# Patient Record
Sex: MALE | Race: WHITE
[De-identification: names, ages, dates, MRNs, and addresses within clinical notes are randomized per-mention and may not be internally consistent; named-entity substitution may affect disease eponyms.]

---

## 2017-01-19 ENCOUNTER — HOSPITAL ENCOUNTER (OUTPATIENT)
Dept: HOSPITAL 47 - OR | Age: 52
Discharge: HOME | End: 2017-01-19
Attending: SURGERY
Payer: COMMERCIAL

## 2017-01-19 VITALS — BODY MASS INDEX: 25.8 KG/M2

## 2017-01-19 VITALS — DIASTOLIC BLOOD PRESSURE: 83 MMHG | HEART RATE: 60 BPM | SYSTOLIC BLOOD PRESSURE: 134 MMHG

## 2017-01-19 VITALS — RESPIRATION RATE: 16 BRPM

## 2017-01-19 VITALS — TEMPERATURE: 97.9 F

## 2017-01-19 DIAGNOSIS — Z88.1: ICD-10-CM

## 2017-01-19 DIAGNOSIS — Z88.0: ICD-10-CM

## 2017-01-19 DIAGNOSIS — K21.9: ICD-10-CM

## 2017-01-19 DIAGNOSIS — K42.9: ICD-10-CM

## 2017-01-19 DIAGNOSIS — K40.20: Primary | ICD-10-CM

## 2017-01-19 PROCEDURE — 49652: CPT

## 2017-01-19 PROCEDURE — 49650 LAP ING HERNIA REPAIR INIT: CPT

## 2017-01-19 RX ADMIN — POTASSIUM CHLORIDE SCH MLS: 14.9 INJECTION, SOLUTION INTRAVENOUS at 14:08

## 2017-01-19 RX ADMIN — POTASSIUM CHLORIDE SCH MLS: 14.9 INJECTION, SOLUTION INTRAVENOUS at 14:10

## 2017-01-19 NOTE — P.OP
Date of Procedure: 01/19/17


Preoperative Diagnosis: 


Symptomatic left inguinal hernia and umbilical hernia


Postoperative Diagnosis: 


Left indirect inguinal hernia


Right direct and indirect inguinal hernia


Umbilical hernia


Procedure(s) Performed: 


Robot-assisted laparoscopic inguinal hernia repair with mesh


Implants: 


10 x 15 Bard mesh that was not trimmed


Anesthesia: LAZARA


Surgeon: Scooter Greenfield


Pathology: none sent


Condition: stable


Disposition: PACU


Description of Procedure: 


Informed consent was obtained patient and then The patient was brought to the 

operating room and placed in supine position.  General anesthesia with 

endotracheal intubation was performed as per anesthesia team.   A bridges 

catheter was inserted under sterile aseptic precautions.  Chlorhexidine was 

used to prep the skin followed by application of sterile drapes .  He was 

placed in the lithotomy positionA timeout was performed to verify correct 

patient, correct procedure and correct side.  Patient was confirmed to receive 

perioperative IV antibiotics, subcutaneous heparin 5000 units and bilateral 

SCDs were placed.  The left upper quadrant point was identified and a stab 

incision was made.  Veress needle was introduced and placement was confirmed 

with the help of the drop test.  The abdomen was then insufflated to 15 mmHg.  

Once that was done 5 mm port was introduced into the left upper quadrant using 

the Optiview technique after which a 12 mm port was placed in the 

supraumbilical position and a 8 mm port in the right lower quadrant and then 

after that the left-sided 5 mm port was replaced with a robot 8 mm port under 

direct vision.  





The robot was then docked with the central camera port and the 2 side working 

ports.  The 30 degree of scope was introduced and the abdomen through the 12 mm 

port site.  Cardiere grasper for the left hand and scissor in the right hand 

was introduced in the abdominal cavity after which the median umbilical fold 

was retracted laterally towards the opposite side a small incision was made at 

the junction of the umbilical fold and the peritoneum and carried all the way 

laterally thus creating a small plane in the preperitoneal space.  The flap was 

developed  further with the help of blunt dissection using gentle stroking 

maneuvers all the way down to Tao ligament medially and laterally we went 

inferior exposing the vessels inferiorly.  The vas was identified and there was 

an indirect inguinal hernia.  Indirect hernia sac was dissected off the cord 

and reduced completely.  Once that was done the inferior flap was enlarged and 

some old made in it.  Attention was turned to the opposite side.  Peritoneal 

flap was created in a similar fashion and while dissecting was a direct hernia 

as well as an indirect hernia.  Both were re reduced with ease.  Once 

appropriate amount of area had been exposed for placement of the mesh 10 x 15 

Pro  mesh was introduced and placed in both right and left sides.  Once 

that was done the peritoneal flaps were closed with running V lock sutures.   

All the needles were then removed and accounted for.  At this time the 

procedure was completed.  The robotic instruments were removed and the robot 

was undocked.  Using the laparoscope 12 mm port site was closed using a Olegario 

Womack under direct vision using 0 PDS thus closing the umbilical hernia 

fascial edges.  Once that was done the abdomen was desufflated and the skin was 

closed with the help of 4-0 Monocryl.  Dermabond was applied.  Patient 

tolerated the procedure well.  There were no complications.  The Bridges catheter 

was removed and the patient extubated taken to recovery room in stable 

condition.

## 2021-06-30 ENCOUNTER — HOSPITAL ENCOUNTER (EMERGENCY)
Dept: HOSPITAL 47 - EC | Age: 56
Discharge: HOME | End: 2021-06-30
Payer: COMMERCIAL

## 2021-06-30 VITALS — HEART RATE: 55 BPM | DIASTOLIC BLOOD PRESSURE: 82 MMHG | SYSTOLIC BLOOD PRESSURE: 126 MMHG | RESPIRATION RATE: 18 BRPM

## 2021-06-30 VITALS — TEMPERATURE: 98 F

## 2021-06-30 DIAGNOSIS — S29.012A: Primary | ICD-10-CM

## 2021-06-30 DIAGNOSIS — Z88.0: ICD-10-CM
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
## 2021-06-30 DIAGNOSIS — E86.0: ICD-10-CM

## 2021-06-30 LAB
ALBUMIN SERPL-MCNC: 4.2 G/DL (ref 3.5–5)
ALP SERPL-CCNC: 58 U/L (ref 38–126)
ALT SERPL-CCNC: 20 U/L (ref 4–49)
ANION GAP SERPL CALC-SCNC: 9 MMOL/L
AST SERPL-CCNC: 22 U/L (ref 17–59)
BASOPHILS # BLD AUTO: 0.1 K/UL (ref 0–0.2)
BASOPHILS NFR BLD AUTO: 1 %
BUN SERPL-SCNC: 17 MG/DL (ref 9–20)
CALCIUM SPEC-MCNC: 9.4 MG/DL (ref 8.4–10.2)
CHLORIDE SERPL-SCNC: 102 MMOL/L (ref 98–107)
CO2 SERPL-SCNC: 27 MMOL/L (ref 22–30)
EOSINOPHIL # BLD AUTO: 1.2 K/UL (ref 0–0.7)
EOSINOPHIL NFR BLD AUTO: 14 %
ERYTHROCYTE [DISTWIDTH] IN BLOOD BY AUTOMATED COUNT: 4.95 M/UL (ref 4.3–5.9)
ERYTHROCYTE [DISTWIDTH] IN BLOOD: 14.3 % (ref 11.5–15.5)
GLUCOSE SERPL-MCNC: 112 MG/DL (ref 74–99)
HCT VFR BLD AUTO: 43 % (ref 39–53)
HGB BLD-MCNC: 14.5 GM/DL (ref 13–17.5)
KETONES UR QL STRIP.AUTO: (no result)
LYMPHOCYTES # SPEC AUTO: 1.9 K/UL (ref 1–4.8)
LYMPHOCYTES NFR SPEC AUTO: 21 %
MCH RBC QN AUTO: 29.4 PG (ref 25–35)
MCHC RBC AUTO-ENTMCNC: 33.8 G/DL (ref 31–37)
MCV RBC AUTO: 87 FL (ref 80–100)
MONOCYTES # BLD AUTO: 0.6 K/UL (ref 0–1)
MONOCYTES NFR BLD AUTO: 6 %
NEUTROPHILS # BLD AUTO: 5.1 K/UL (ref 1.3–7.7)
NEUTROPHILS NFR BLD AUTO: 57 %
PH UR: 5.5 [PH] (ref 5–8)
PLATELET # BLD AUTO: 222 K/UL (ref 150–450)
POTASSIUM SERPL-SCNC: 4.2 MMOL/L (ref 3.5–5.1)
PROT SERPL-MCNC: 6.4 G/DL (ref 6.3–8.2)
SODIUM SERPL-SCNC: 138 MMOL/L (ref 137–145)
SP GR UR: 1.03 (ref 1–1.03)
UROBILINOGEN UR QL STRIP: <2 MG/DL (ref ?–2)
WBC # BLD AUTO: 9 K/UL (ref 3.8–10.6)

## 2021-06-30 PROCEDURE — 96372 THER/PROPH/DIAG INJ SC/IM: CPT

## 2021-06-30 PROCEDURE — 85025 COMPLETE CBC W/AUTO DIFF WBC: CPT

## 2021-06-30 PROCEDURE — 96374 THER/PROPH/DIAG INJ IV PUSH: CPT

## 2021-06-30 PROCEDURE — 80053 COMPREHEN METABOLIC PANEL: CPT

## 2021-06-30 PROCEDURE — 81003 URINALYSIS AUTO W/O SCOPE: CPT

## 2021-06-30 PROCEDURE — 36415 COLL VENOUS BLD VENIPUNCTURE: CPT

## 2021-06-30 PROCEDURE — 96360 HYDRATION IV INFUSION INIT: CPT

## 2021-06-30 PROCEDURE — 99283 EMERGENCY DEPT VISIT LOW MDM: CPT

## 2021-06-30 NOTE — ED
Back Pain HPI





- General


Chief Complaint: Back Pain/Injury


Stated Complaint: back pain


Source: patient, family, RN notes reviewed


Limitations: no limitations





- History of Present Illness


Initial Comments: 





56-year-old white male presents to the emergency room with right-sided back pain

that he woke up with today.  He noticed a little bit of tightness last night he 

also noticed some tightness in his neck.  Patient denies any injury no heavy 

lifting.  She denies any fevers or dysuria.  No chest pain nausea vomiting or 

diarrhea.  He has a history of osteoarthritis, hypercholesterolemia and 

bilateral hernia repair.  States the pain 8 out of 10.  Wife at bedside states 

that she's had this type of pain before and was a kidney stone patient has no 

history of kidney stones.  


MD Complaint: back pain


-: days(s) (1)


Similar Symptoms Previously: No


Severity scale (1-10): 8


Quality: aching


Consistency: constant


Improves With: none


Worsens With: none


Associated Symptoms: denies other symptoms





- Related Data


                                  Previous Rx's











 Medication  Instructions  Recorded


 


HYDROcodone/APAP 5-325MG [Norco 1 tab PO Q4HR PRN #25 tab 01/19/17





5-325]  


 


Cyclobenzaprine [Flexeril] 5 mg PO TID PRN #15 tablet 06/30/21











                                    Allergies











Allergy/AdvReac Type Severity Reaction Status Date / Time


 


ampicillin Allergy  Dyspnea, Verified 06/30/21 18:56





   RASH  


 


Penicillins Allergy  Dyspnea, Verified 06/30/21 18:56





   RASH  














Review of Systems


ROS Statement: 


Those systems with pertinent positive or pertinent negative responses have been 

documented in the HPI.





ROS Other: All systems not noted in ROS Statement are negative.





Past Medical History


Past Medical History: Hyperlipidemia, Osteoarthritis (OA)


Additional Past Medical History / Comment(s): MINOR OA.  BILAT INGUINAL HERNIA


History of Any Multi-Drug Resistant Organisms: None Reported


Past Surgical History: Hernia Repair


Additional Past Surgical History / Comment(s): COLONOSCOPY


Past Anesthesia/Blood Transfusion Reactions: Motion Sickness


Past Psychological History: No Psychological Hx Reported


Smoking Status: Never smoker


Past Alcohol Use History: Occasional


Past Drug Use History: None Reported





- Past Family History


  ** Mother


Family Medical History: Cancer





General Exam


Limitations: no limitations


General appearance: alert, in no apparent distress


Head exam: Present: atraumatic, normocephalic, normal inspection


Eye exam: Present: normal appearance, PERRL, EOMI.  Absent: scleral icterus, 

conjunctival injection, periorbital swelling


ENT exam: Present: normal exam, normal oropharynx, mucous membranes moist


Neck exam: Present: normal inspection, full ROM.  Absent: tenderness, 

meningismus, lymphadenopathy, thyromegaly


Respiratory exam: Present: normal lung sounds bilaterally.  Absent: respiratory 

distress, wheezes, rales, rhonchi, stridor, chest wall tenderness, accessory mus

saad use, decreased breath sounds, prolonged expiratory


Cardiovascular Exam: Present: regular rate, normal rhythm, normal heart sounds. 

 Absent: systolic murmur, diastolic murmur, rubs, gallop, clicks


GI/Abdominal exam: Present: soft, normal bowel sounds.  Absent: distended, 

tenderness, guarding, rebound, rigid


Extremities exam: Present: normal inspection, full ROM, normal capillary refill.

  Absent: tenderness, pedal edema, joint swelling, calf tenderness


Back exam: Present: normal inspection, full ROM, other (Negative straight leg 

test bilaterally).  Absent: tenderness, CVA tenderness (R), CVA tenderness (L), 

muscle spasm, paraspinal tenderness, vertebral tenderness, rash noted


Neurological exam: Present: alert, oriented X3, CN II-XII intact


Psychiatric exam: Present: normal affect, normal mood


Skin exam: Present: warm, dry, intact, normal color.  Absent: rash, cyanosis, 

diaphoretic, erythema, petechiae, pallor, mottled





Course


                                   Vital Signs











  06/30/21





  18:55


 


Temperature 98 F


 


Pulse Rate 69


 


Respiratory 16





Rate 


 


Blood Pressure 147/93


 


O2 Sat by Pulse 98





Oximetry 














Medical Decision Making





- Medical Decision Making





WBC count was 9.0, platelet creatinine 17 and 0.76 respectively, urine shows 3+ 

ketones no blood no nitrites no leukocyte esterase.  This is consistent with 

dehydration and muscle cramping.  Patient was given Norflex with Toradol and a 

liter of fluid with relief.  He'll be directed to follow up with his primary 

care doctor as needed.  He'll be prescribed Flexeril as needed and Motrin 

over-the-counter.  Discussed with Dr. Diaz was agreeable with this plan of 

care.





- Lab Data


Result diagrams: 


                                 06/30/21 19:31





                                 06/30/21 19:31


                                   Lab Results











  06/30/21 06/30/21 06/30/21 Range/Units





  19:31 19:31 19:31 


 


WBC  9.0    (3.8-10.6)  k/uL


 


RBC  4.95    (4.30-5.90)  m/uL


 


Hgb  14.5    (13.0-17.5)  gm/dL


 


Hct  43.0    (39.0-53.0)  %


 


MCV  87.0    (80.0-100.0)  fL


 


MCH  29.4    (25.0-35.0)  pg


 


MCHC  33.8    (31.0-37.0)  g/dL


 


RDW  14.3    (11.5-15.5)  %


 


Plt Count  222    (150-450)  k/uL


 


MPV  6.6    


 


Neutrophils %  57    %


 


Lymphocytes %  21    %


 


Monocytes %  6    %


 


Eosinophils %  14    %


 


Basophils %  1    %


 


Neutrophils #  5.1    (1.3-7.7)  k/uL


 


Lymphocytes #  1.9    (1.0-4.8)  k/uL


 


Monocytes #  0.6    (0-1.0)  k/uL


 


Eosinophils #  1.2 H    (0-0.7)  k/uL


 


Basophils #  0.1    (0-0.2)  k/uL


 


Sodium    138  (137-145)  mmol/L


 


Potassium    4.2  (3.5-5.1)  mmol/L


 


Chloride    102  ()  mmol/L


 


Carbon Dioxide    27  (22-30)  mmol/L


 


Anion Gap    9  mmol/L


 


BUN    17  (9-20)  mg/dL


 


Creatinine    0.76  (0.66-1.25)  mg/dL


 


Est GFR (CKD-EPI)AfAm    >90  (>60 ml/min/1.73 sqM)  


 


Est GFR (CKD-EPI)NonAf    >90  (>60 ml/min/1.73 sqM)  


 


Glucose    112 H  (74-99)  mg/dL


 


Calcium    9.4  (8.4-10.2)  mg/dL


 


Total Bilirubin    0.4  (0.2-1.3)  mg/dL


 


AST    22  (17-59)  U/L


 


ALT    20  (4-49)  U/L


 


Alkaline Phosphatase    58  ()  U/L


 


Total Protein    6.4  (6.3-8.2)  g/dL


 


Albumin    4.2  (3.5-5.0)  g/dL


 


Urine Color   Yellow   


 


Urine Appearance   Clear   (Clear)  


 


Urine pH   5.5   (5.0-8.0)  


 


Ur Specific Gravity   1.027   (1.001-1.035)  


 


Urine Protein   Negative   (Negative)  


 


Urine Glucose (UA)   Negative   (Negative)  


 


Urine Ketones   3+ H   (Negative)  


 


Urine Blood   Negative   (Negative)  


 


Urine Nitrite   Negative   (Negative)  


 


Urine Bilirubin   Negative   (Negative)  


 


Urine Urobilinogen   <2.0   (<2.0)  mg/dL


 


Ur Leukocyte Esterase   Negative   (Negative)  














Disposition


Clinical Impression: 


 Dehydration, Muscle strain





Disposition: HOME SELF-CARE


Condition: Good


Instructions (If sedation given, give patient instructions):  Acute Low Back 

Pain (ED)


Additional Instructions: 


Take Motrin over-the-counter as prescribed and take Flexeril as needed for 

muscle spasms.  Increase fluid intake.  Follow-up with the primary care doctor 

in 1 week.


Prescriptions: 


Cyclobenzaprine [Flexeril] 5 mg PO TID PRN #15 tablet


 PRN Reason: Muscle Spasm


Is patient prescribed a controlled substance at d/c from ED?: No


Referrals: 


Gayathri Ward DO [Primary Care Provider] - 1-2 days


Time of Disposition: 21:25

## 2021-07-01 ENCOUNTER — HOSPITAL ENCOUNTER (EMERGENCY)
Dept: HOSPITAL 47 - EC | Age: 56
Discharge: HOME | End: 2021-07-01
Payer: COMMERCIAL

## 2021-07-01 VITALS — HEART RATE: 63 BPM | SYSTOLIC BLOOD PRESSURE: 147 MMHG | DIASTOLIC BLOOD PRESSURE: 92 MMHG | TEMPERATURE: 98 F

## 2021-07-01 VITALS — RESPIRATION RATE: 16 BRPM

## 2021-07-01 DIAGNOSIS — Z88.0: ICD-10-CM

## 2021-07-01 DIAGNOSIS — S39.012A: Primary | ICD-10-CM
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
## 2021-07-01 PROCEDURE — 72100 X-RAY EXAM L-S SPINE 2/3 VWS: CPT

## 2021-07-01 PROCEDURE — 96372 THER/PROPH/DIAG INJ SC/IM: CPT

## 2021-07-01 PROCEDURE — 99283 EMERGENCY DEPT VISIT LOW MDM: CPT

## 2021-07-01 NOTE — XR
EXAMINATION TYPE: XR lumbar spine 2 or 3V

 

DATE OF EXAM: 7/1/2021

 

COMPARISON: NONE

 

HISTORY: 56-year-old male with back pain

 

TECHNIQUE: Frontal and lateral radiographs of the lumbar spine

 

FINDINGS: There are 5 nonrib-bearing lumbar-type vertebral bodies.

 

There is slight straightening of lumbar curvature.

 

Vertebral body heights are normal.

 

Mild to moderate narrowing of the intervertebral spaces at L3-4, L4-5 and L5-S1.

 

Facet joint arthropathy changes and from T12 through S1.

 

Large amount of stool noted in the colon.

 

IMPRESSION: 

1. No acute osseous abnormality.

2. Mild-to-moderate degenerative changes in the lumbar spine.

3. Large amount of stool noted in the colon.

## 2021-07-01 NOTE — ED
Back Pain HPI





- General


Chief Complaint: Back Pain/Injury


Stated Complaint: Back Pain


Time Seen by Provider: 07/01/21 20:00


Source: patient





- History of Present Illness


Initial Comments: 





56-year-old male presents to the emergency department with a chief complaint of 

back pain.  Patient states he was evaluated yesterday in emergency department 

but is symptoms return.  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 

facility.  States his work is very labor intensive and he does have occasional 

back pain but nothing this significant.  States mostly the pain is located in 

the right lumbosacral region with no radiation of pain.  States the pain is 

exacerbated with a twisting motion whenever he is laying flat.  Denies any 

saddle anesthesia, urinary retention with bowel or overflow incontinence.  

Denies any fevers and chills.  Denies any obstructive or infectious urinary 

symptoms.  Denies any abdominal pain.





- Related Data


                                  Previous Rx's











 Medication  Instructions  Recorded


 


HYDROcodone/APAP 5-325MG [Norco 1 tab PO Q4HR PRN #25 tab 01/19/17





5-325]  


 


Cyclobenzaprine [Flexeril] 5 mg PO TID PRN #15 tablet 06/30/21











                                    Allergies











Allergy/AdvReac Type Severity Reaction Status Date / Time


 


ampicillin Allergy  Dyspnea, Verified 07/01/21 19:59





   RASH  


 


Penicillins Allergy  Dyspnea, Verified 07/01/21 19:59





   RASH  














Review of Systems


ROS Statement: 


Those systems with pertinent positive or pertinent negative responses have been 

documented in the HPI.





ROS Other: All systems not noted in ROS Statement are negative.





Past Medical History


Past Medical History: Hyperlipidemia, Osteoarthritis (OA)


Additional Past Medical History / Comment(s): MINOR OA.  BILAT INGUINAL HERNIA


History of Any Multi-Drug Resistant Organisms: None Reported


Past Surgical History: Hernia Repair


Additional Past Surgical History / Comment(s): COLONOSCOPY


Past Anesthesia/Blood Transfusion Reactions: Motion Sickness


Past Psychological History: No Psychological Hx Reported


Smoking Status: Never smoker


Past Alcohol Use History: Occasional


Past Drug Use History: None Reported





- Past Family History


  ** Mother


Family Medical History: Cancer





General Exam


Limitations: no limitations


General appearance: alert, in no apparent distress


Head exam: Present: atraumatic, normocephalic, normal inspection


Eye exam: Present: normal appearance, PERRL, EOMI


Pupils: Present: normal accommodation


ENT exam: Present: normal exam, normal oropharynx, mucous membranes moist


Neck exam: Present: normal inspection, full ROM.  Absent: tenderness, 

lymphadenopathy


Respiratory exam: Present: normal lung sounds bilaterally.  Absent: respiratory 

distress, wheezes, rales, rhonchi, stridor


Cardiovascular Exam: Present: regular rate, normal rhythm, normal heart sounds. 

 Absent: systolic murmur, diastolic murmur


GI/Abdominal exam: Present: soft.  Absent: distended, tenderness, guarding, 

rebound


Extremities exam: Present: normal inspection, full ROM, normal capillary refill.

  Absent: tenderness


Back exam: Present: normal inspection, full ROM, tenderness, CVA tenderness (R),

 paraspinal tenderness (Right paraspinal tenderness in the lumbosacral spine).  

Absent: CVA tenderness (L)


Neurological exam: Present: alert, oriented X3


Psychiatric exam: Present: normal affect, normal mood


Skin exam: Present: warm, dry, intact, normal color





Course


                                   Vital Signs











  07/01/21 07/01/21





  19:55 21:55


 


Temperature 98 F 


 


Pulse Rate 63 


 


Respiratory 18 16





Rate  


 


Blood Pressure 147/92 


 


O2 Sat by Pulse 99 





Oximetry  














Medical Decision Making





- Medical Decision Making





56-year-old male presents to the emergency department with a chief complaint of 

back pain.  On physical examination, right-sided paraspinal tenderness in the 

lumbosacral region.  No concern for cauda equina.  Patient was given symptomatic

 relief.  X-ray of the lumbar spine reveals mild to moderate degenerative disc 

disease in the lumbar spine.  There is also a large amount of stool in the 

colon.  Patient will be discharged with Tylenol 3 starter pack.  Also advised to

 take laxative in order to promote bowel movement.  Strict return parameters 

were thoroughly discussed the patient is an attending agreeable.  Case discussed

 with Dr. Barr.





Disposition


Clinical Impression: 


 Strain of lumbar region





Disposition: HOME SELF-CARE


Condition: Stable


Instructions (If sedation given, give patient instructions):  Acute Low Back 

Pain (ED)


Additional Instructions: 


Follow with orthopedic specialist.  Return to emergency department if symptoms 

worsen.


Is patient prescribed a controlled substance at d/c from ED?: No


Referrals: 


Gayathri Ward DO [Primary Care Provider] - 1-2 days


Ramon Trivedi MD [STAFF PHYSICIAN] - 1-2 days


Time of Disposition: 21:52

## 2022-09-19 ENCOUNTER — HOSPITAL ENCOUNTER (OUTPATIENT)
Dept: HOSPITAL 47 - EC | Age: 57
Setting detail: OBSERVATION
LOS: 1 days | Discharge: HOME | End: 2022-09-20
Attending: INTERNAL MEDICINE | Admitting: INTERNAL MEDICINE
Payer: COMMERCIAL

## 2022-09-19 DIAGNOSIS — E66.9: ICD-10-CM

## 2022-09-19 DIAGNOSIS — Z98.890: ICD-10-CM

## 2022-09-19 DIAGNOSIS — Z88.0: ICD-10-CM

## 2022-09-19 DIAGNOSIS — Z82.49: ICD-10-CM

## 2022-09-19 DIAGNOSIS — R07.81: ICD-10-CM

## 2022-09-19 DIAGNOSIS — R68.83: ICD-10-CM

## 2022-09-19 DIAGNOSIS — R91.8: ICD-10-CM

## 2022-09-19 DIAGNOSIS — S30.0XXA: ICD-10-CM

## 2022-09-19 DIAGNOSIS — Z80.9: ICD-10-CM

## 2022-09-19 DIAGNOSIS — R07.89: Primary | ICD-10-CM

## 2022-09-19 DIAGNOSIS — M19.071: ICD-10-CM

## 2022-09-19 DIAGNOSIS — Z86.16: ICD-10-CM
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
## 2022-09-19 DIAGNOSIS — E78.5: ICD-10-CM

## 2022-09-19 DIAGNOSIS — K21.9: ICD-10-CM

## 2022-09-19 DIAGNOSIS — I08.1: ICD-10-CM

## 2022-09-19 DIAGNOSIS — Z20.822: ICD-10-CM

## 2022-09-19 DIAGNOSIS — M19.072: ICD-10-CM

## 2022-09-19 DIAGNOSIS — R00.1: ICD-10-CM

## 2022-09-19 LAB
ALBUMIN SERPL-MCNC: 4.7 G/DL (ref 3.5–5)
ALP SERPL-CCNC: 67 U/L (ref 38–126)
ALT SERPL-CCNC: 20 U/L (ref 4–49)
ANION GAP SERPL CALC-SCNC: 11 MMOL/L
APTT BLD: 24.6 SEC (ref 22–30)
AST SERPL-CCNC: 20 U/L (ref 17–59)
BASOPHILS # BLD AUTO: 0.1 K/UL (ref 0–0.2)
BASOPHILS NFR BLD AUTO: 1 %
BUN SERPL-SCNC: 17 MG/DL (ref 9–20)
CALCIUM SPEC-MCNC: 9.6 MG/DL (ref 8.4–10.2)
CHLORIDE SERPL-SCNC: 101 MMOL/L (ref 98–107)
CO2 SERPL-SCNC: 24 MMOL/L (ref 22–30)
EOSINOPHIL # BLD AUTO: 0.1 K/UL (ref 0–0.7)
EOSINOPHIL NFR BLD AUTO: 1 %
ERYTHROCYTE [DISTWIDTH] IN BLOOD BY AUTOMATED COUNT: 4.97 M/UL (ref 4.3–5.9)
ERYTHROCYTE [DISTWIDTH] IN BLOOD: 14.3 % (ref 11.5–15.5)
GLUCOSE SERPL-MCNC: 123 MG/DL (ref 74–99)
HCT VFR BLD AUTO: 44.3 % (ref 39–53)
HGB BLD-MCNC: 14.9 GM/DL (ref 13–17.5)
INR PPP: 0.9 (ref ?–1.2)
LYMPHOCYTES # SPEC AUTO: 1.2 K/UL (ref 1–4.8)
LYMPHOCYTES NFR SPEC AUTO: 14 %
MAGNESIUM SPEC-SCNC: 2 MG/DL (ref 1.6–2.3)
MCH RBC QN AUTO: 29.9 PG (ref 25–35)
MCHC RBC AUTO-ENTMCNC: 33.6 G/DL (ref 31–37)
MCV RBC AUTO: 89.1 FL (ref 80–100)
MONOCYTES # BLD AUTO: 0.7 K/UL (ref 0–1)
MONOCYTES NFR BLD AUTO: 8 %
NEUTROPHILS # BLD AUTO: 6.6 K/UL (ref 1.3–7.7)
NEUTROPHILS NFR BLD AUTO: 75 %
PLATELET # BLD AUTO: 234 K/UL (ref 150–450)
POTASSIUM SERPL-SCNC: 4.2 MMOL/L (ref 3.5–5.1)
PROT SERPL-MCNC: 6.9 G/DL (ref 6.3–8.2)
PT BLD: 10.2 SEC (ref 9–12)
SODIUM SERPL-SCNC: 136 MMOL/L (ref 137–145)
WBC # BLD AUTO: 8.7 K/UL (ref 3.8–10.6)

## 2022-09-19 PROCEDURE — 96375 TX/PRO/DX INJ NEW DRUG ADDON: CPT

## 2022-09-19 PROCEDURE — 96372 THER/PROPH/DIAG INJ SC/IM: CPT

## 2022-09-19 PROCEDURE — 85610 PROTHROMBIN TIME: CPT

## 2022-09-19 PROCEDURE — 85730 THROMBOPLASTIN TIME PARTIAL: CPT

## 2022-09-19 PROCEDURE — 96365 THER/PROPH/DIAG IV INF INIT: CPT

## 2022-09-19 PROCEDURE — 96367 TX/PROPH/DG ADDL SEQ IV INF: CPT

## 2022-09-19 PROCEDURE — 80053 COMPREHEN METABOLIC PANEL: CPT

## 2022-09-19 PROCEDURE — 99285 EMERGENCY DEPT VISIT HI MDM: CPT

## 2022-09-19 PROCEDURE — 84484 ASSAY OF TROPONIN QUANT: CPT

## 2022-09-19 PROCEDURE — 85025 COMPLETE CBC W/AUTO DIFF WBC: CPT

## 2022-09-19 PROCEDURE — 93005 ELECTROCARDIOGRAM TRACING: CPT

## 2022-09-19 PROCEDURE — 83735 ASSAY OF MAGNESIUM: CPT

## 2022-09-19 PROCEDURE — 87635 SARS-COV-2 COVID-19 AMP PRB: CPT

## 2022-09-19 PROCEDURE — 93306 TTE W/DOPPLER COMPLETE: CPT

## 2022-09-19 PROCEDURE — 87040 BLOOD CULTURE FOR BACTERIA: CPT

## 2022-09-19 PROCEDURE — 71046 X-RAY EXAM CHEST 2 VIEWS: CPT

## 2022-09-19 PROCEDURE — 80061 LIPID PANEL: CPT

## 2022-09-19 PROCEDURE — 85379 FIBRIN DEGRADATION QUANT: CPT

## 2022-09-19 PROCEDURE — 94760 N-INVAS EAR/PLS OXIMETRY 1: CPT

## 2022-09-19 PROCEDURE — 36415 COLL VENOUS BLD VENIPUNCTURE: CPT

## 2022-09-19 PROCEDURE — 96376 TX/PRO/DX INJ SAME DRUG ADON: CPT

## 2022-09-19 RX ADMIN — KETOROLAC TROMETHAMINE SCH: 15 INJECTION, SOLUTION INTRAMUSCULAR; INTRAVENOUS at 17:17

## 2022-09-19 RX ADMIN — NITROGLYCERIN SCH: 20 OINTMENT TOPICAL at 23:38

## 2022-09-19 RX ADMIN — NITROGLYCERIN SCH INCH: 20 OINTMENT TOPICAL at 18:09

## 2022-09-19 RX ADMIN — KETOROLAC TROMETHAMINE SCH MG: 15 INJECTION, SOLUTION INTRAMUSCULAR; INTRAVENOUS at 22:28

## 2022-09-19 NOTE — ED
Chest Pain HPI





- General


Chief Complaint: Chest Pain


Stated Complaint: chest pain


Time Seen by Provider: 09/19/22 13:34


Source: patient


Mode of arrival: ambulatory


Limitations: no limitations





- History of Present Illness


Initial Comments: 





This 57-year-old male presents complaining of some left-sided chest pain.  He 

states that it started yesterday when he was bending over a car.  It is 

described as a dull achy type sensation that radiates into his left shoulder.  

It is slightly worse with deep inspiration.  He states that it seems somewhat 

worse when he is sitting down at rest and feels somewhat better when he is 

walking.  He denies any previous similar incidents.  He has never had any 

cardiac issues in the past.  He's never had a stress test or a heart 

catheterization.  There is no leg pain or swelling.  He denies any history of 

DVT or PE.  He denies any chest injuries.  The pain is not reproducible with 

movements.  He denies any other complaints or modifying factors.  He denies any 

coughing or problems with fevers.  He states that the pain is somewhat worsened 

when he takes a deep breath.





- Related Data


                                Home Medications











 Medication  Instructions  Recorded  Confirmed


 


No Known Home Medications  09/19/22 09/19/22











                                    Allergies











Allergy/AdvReac Type Severity Reaction Status Date / Time


 


ampicillin Allergy  Dyspnea, Verified 09/19/22 15:28





   RASH  


 


Penicillins Allergy  Dyspnea, Verified 09/19/22 15:28





   RASH  














Review of Systems


ROS Statement: 


Those systems with pertinent positive or pertinent negative responses have been 

documented in the HPI.





ROS Other: All systems not noted in ROS Statement are negative.





Past Medical History


Past Medical History: Hyperlipidemia, Osteoarthritis (OA)


Additional Past Medical History / Comment(s): MINOR OA.  BILAT INGUINAL HERNIA


History of Any Multi-Drug Resistant Organisms: None Reported


Past Surgical History: Hernia Repair


Additional Past Surgical History / Comment(s): COLONOSCOPY


Past Anesthesia/Blood Transfusion Reactions: Motion Sickness


Past Psychological History: No Psychological Hx Reported


Smoking Status: Never smoker


Past Alcohol Use History: Occasional


Past Drug Use History: None Reported





- Past Family History


  ** Mother


Family Medical History: Cancer





General Exam





- General Exam Comments


Initial Comments: 





GENERAL: The patient is well nourished and well hydrated. 


VITAL SIGNS: Heart rate, blood pressure, respiratory rate reviewed as recorded 

in nurse's notes. 


EYES: Pupils are round and reactive. Extraocular movements are intact. No 

conjunctival / lid redness or swelling. 


ENT: No external evidence of injury, swelling, or ecchymosis. Airway is patent. 

Throat is clear. 


NECK: Nontender. No swelling or evidence of injury. No subcutaneous emphysema. 

Trachea is midline. No thyroid mass. 


HEART: Regular rate and rhythm. Good peripheral pulses. 


LUNGS/CHEST: Breath sounds clear and equal bilaterally. No rales, rhonchi, or 

wheezes. No ecchymosis, subcutaneous emphysema, or tenderness. 


ABDOMEN: Abdomen soft without tenderness. No palpable masses or organomegaly. No

peritoneal signs. No abdominal wall swelling or ecchymosis. 


EXTREMITIES: No extremity tenderness. Normal muscle tone and function. No 

thoracolumbar tenderness. 


NEUROLOGIC: Sensation is grossly intact. Cranial nerve exam reveals face is 

symmetrical, tongue is midline, speech is clear. 


SKIN: No abrasions or ecchymosis is noted. No induration or masses noted. 


PSYCHIATRIC: Alert and oriented. Appropriate behavior and judgment.





Limitations: no limitations





Course


                                   Vital Signs











  09/19/22 09/19/22 09/19/22





  13:20 14:39 15:49


 


Temperature 99.3 F  


 


Pulse Rate 86 82 68


 


Respiratory 16 18 18





Rate   


 


Blood Pressure 133/91 119/83 112/74


 


O2 Sat by Pulse 96 94 L 95





Oximetry   














Chest Pain MDM





- MDM





The patient was seen and examined.  All diagnostics are reviewed.  His EKG shows

a normal sinus rhythm.  There is some mild artifact noted.  There is no acute 

ST-T wave changes identified.  The NM intervals 129, QS duration is 85, and the 

QTc interval 393.  The patient received aspirin as well as Nitropaste.  On 

recheck, he is still having the pain.  He receives Toradol IV as well.  He has a

chest x-ray which shows either pneumonia, atelectasis, or effusion noted in the 

left lower lobe.  The patient may have a degree of pneumonia and is therefore 

started on Rocephin and Zithromax intravenously.  The laboratory is all 

essentially within normal limits with a negative d-dimer.  His symptomatology 

sounds more cardiac in nature.  It is felt as though he benefit from admission 

for further workup in this regard.  Patient is agreeable.  Case will be 

discussed with internal medicine in the near future.





Disposition


Clinical Impression: 


 Chest pain, Unstable angina, Pneumonia





Disposition: ADMITTED AS IP TO THIS [facility]


Condition: Fair


Time of Disposition: 16:00


Decision Date: 09/19/22


Decision Time: 16:00

## 2022-09-19 NOTE — XR
EXAMINATION TYPE: XR chest 2V

 

DATE OF EXAM: 9/19/2022

 

COMPARISON: NONE

 

HISTORY: Chest pain

 

TECHNIQUE:  Frontal and lateral views of the chest are obtained.

 

FINDINGS:  Patchy densities present at the left lung base. No evident pneumothorax. Cardiac mediastin
al silhouette is within normal limits. Patient is rotated. Bone mineralization is normal. There is th
oracic spondylosis. Lung volumes are low.

 

IMPRESSION:  Correlate for left lower lobe pneumonia versus atelectasis, difficult to exclude effusio
n

## 2022-09-20 VITALS
DIASTOLIC BLOOD PRESSURE: 50 MMHG | HEART RATE: 68 BPM | TEMPERATURE: 98.3 F | RESPIRATION RATE: 14 BRPM | SYSTOLIC BLOOD PRESSURE: 127 MMHG

## 2022-09-20 LAB
CHOLEST SERPL-MCNC: 210 MG/DL (ref 0–200)
HDLC SERPL-MCNC: 54.7 MG/DL (ref 40–60)
LDLC SERPL CALC-MCNC: 130.3 MG/DL (ref 0–131)
TRIGL SERPL-MCNC: 125 MG/DL (ref 0–149)
VLDLC SERPL CALC-MCNC: 25 MG/DL (ref 5–40)

## 2022-09-20 RX ADMIN — NITROGLYCERIN SCH: 20 OINTMENT TOPICAL at 12:35

## 2022-09-20 RX ADMIN — KETOROLAC TROMETHAMINE SCH MG: 15 INJECTION, SOLUTION INTRAMUSCULAR; INTRAVENOUS at 12:31

## 2022-09-20 RX ADMIN — NITROGLYCERIN SCH: 20 OINTMENT TOPICAL at 05:30

## 2022-09-20 RX ADMIN — KETOROLAC TROMETHAMINE SCH MG: 15 INJECTION, SOLUTION INTRAMUSCULAR; INTRAVENOUS at 05:28

## 2022-09-20 NOTE — P.HPIM
History of Present Illness


H&P Date: 09/20/22











This is a pleasant 57-year-old male who presented to the emergency department 

with some left-sided chest pain that was dull and achy and radiating up into his

left shoulder and experiencing worsening acid reflux.  Patient reports it 

started 1 day prior and becoming more intense and difficulty to take deep 

inspiration.  Patient denies any recent sick contacts, shortness of breath, or 

cough with congestion.  Patient follows with Dr. Brumfield in the outpatient 

setting and denies significant past medical history other than borderline high 

cholesterol and some mild  osteoarthritis.  Patient reports to never smoking 

does not gape or use any other drugs and occasionally drinks alcohol socially.  

Patient also reports he was doing some work outside and subsequently fell onto 

the concrete approximately 2 weeks ago with a large hematoma of his left buttock

that had radiated up to his back.  Patient denies any aspirin use or blood 

thinners.  Patient is vaccinated for Covid and reports having it earlier this 

year with no respiratory symptoms.  Chest x-ray on admission shows a correlate 

for left lower lobe pneumonia versus atelectasis and difficult to exclude 

effusion but no evident pneumothorax.  Cardiology was consulted and echo was 

ordered and patient was evaluated by cardiology recommending outpatient follow-

up as needed.  An echo showed an EF of 55-60 with some mild regurgitation noted 

otherwise normal LV systolic function.  Patient was started on empiric 

antibiotics in the form of ceftriaxone and Zithromax in the ER.  Patient with no

fevers and WBC was normal.  EKG showed sinus bradycardia.








Review Of Systems:





Constitutional: No fever, no chills, no night sweats.  No weight change.  No 

weakness, fatigue or lethargy.  No daytime sleepiness.


EENT: No headache.  No blurred vision or double vision, no loss of vision.  No 

loss of Hearing, no ringing in the ears, no dizziness.  No nasal drainage or 

congestion.  No epistaxis.  No sore throat.


Lungs: Reports some mild shortness of breath with deep inspiration, no cough, no

sputum production.  No wheezing.


Cardiovascular: Reports epigastric chest pain, no lower extremity edema.  No 

palpitations.  No paroxysmal nocturnal dyspnea.  No orthopnea.  No 

lightheadedness or dizziness.  No syncopal episodes.


Abdominal: No abdominal pain.  No nausea, vomiting.  No diarrhea.  No 

constipation.  No bloody or tarry stools..  No loss of appetite.


Genitourinary: No dysuria, increased frequency, urgency.  No urinary retention.


Musculoskeletal: No myalgias.  No muscle weakness, no gait dysfunction, no 

frequent falls.  No back pain.  No neck pain.


Integumentary: No wounds, no lesions.  No rash or pruritus.  Reports left 

buttock bruising.  Status post fall 2 weeks ago  No change in hair or nails.


Neurologic: No aphasia. No facial droop. No change in mentation. No head injury.

No headache. No paralysis. No paresthesia.


Psychiatric: No depression.  No anxiety.  No mood swings.


Endocrine: No abnormal blood sugars.  No weight change.  No excessive sweating 

or thirst.  No cold intolerance.  














PHYSICAL EXAMINATION: 





GENERAL: The patient is alert and oriented x4,  Well developed, well nourished. 


HEENT: Pupils are round and equally reacting to light. EOMI. no scleral icterus.

No conjunctival pallor. Normocephalic, atraumatic. No pharyngeal erythema. No 

thyromegaly.  


CARDIOVASCULAR: S1 and S2  muffled 


PULMONARY: diminished breath sounds bilaterally otherwise clear to auscultation 

with no wheezing or rhonchi noted. 


ABDOMEN: soft.  Nontender on exam.  non-distended, normoactive bowel sounds. No 

palpable organomegaly. 


MUSCULOSKELETAL: No joint swelling or deformity.


EXTREMITIES: No cyanosis, clubbing, or pedal edema.  


NEUROLOGICAL: Gross neurological examination did not reveal any focal deficits. 


SKIN: No rashes. 





Assessment:





Chest discomfort, atypical, ACS ruled out


Pleuritic chest pain on deep inspiration


Possible left lower lobe pneumonia, although suspicion is low and treated 

empirically


Hyperlipidemia


Recent fall with no LOC onto the left buttock with a large bruised buttock


GI prophylaxis


DVT prophylaxis


Full code





Plan:





Recommend to continue with current medications and management and cardiology has

evaluated the patient and underwent 2-D echo showing LV systolic function is 

normal with an EF of 55-60% with some mild concentric LVH and mild mitral and 

tricuspid regurgitation present recommending outpatient follow-up as needed.  

Serial troponins have been negative 3.  Patient is borderline h

ypercholesterolemia and reports he has been working on his diet and does follow 

with Dr. Brumfield for this.  Patient was initiated on empiric ceftriaxone and 

Zithromax for questionable possible left lower lobe infiltrate versus 

atelectasis.  We'll provide incentive spirometer and continue with empiric 

antibiotics for 5 days to complete the course.  Strongly recommend follow-up 

with primary care provider and possible chest x-ray in the next few weeks.  

Patient is maintaining oxygen saturation on room air above 95% and encouraged to

continue using incentive spirometer at least 10 times every hour while awake.  

D-dimer normal and WBC normal and patient is afebrile.  Patient will likely 

discharge later this afternoon.








The impression and plan of care has been dictated by Oly Enriquez, nurse 

practitioner as directed.





Dr. Marycruz MENARD


I have performed a history and examination and MDM of this patient, discussed 

the same with the dictator, and  agree with the dictator's assessment and plan 

as written ,documented as a scribe. Based on total visit time,  I have performed

more than 50% of the visit. Any additional findings or plans will be noted. 





Past Medical History


Past Medical History: Hyperlipidemia, Osteoarthritis (OA)


Additional Past Medical History / Comment(s): MINOR OA. in bilat feet which pt 

stated has caused some balance issues.  BILAT INGUINAL HERNIA


History of Any Multi-Drug Resistant Organisms: None Reported


Past Surgical History: Hernia Repair


Additional Past Surgical History / Comment(s): COLONOSCOPY


Past Anesthesia/Blood Transfusion Reactions: Motion Sickness


Past Psychological History: No Psychological Hx Reported


Smoking Status: Never smoker


Past Alcohol Use History: Occasional


Additional Past Alcohol Use History / Comment(s): 6 PACK PER WK


Past Drug Use History: None Reported





- Past Family History


  ** Mother


Family Medical History: Cancer





Medications and Allergies


                                Home Medications











 Medication  Instructions  Recorded  Confirmed  Type


 


Azithromycin [Zithromax] 500 mg PO DAILY 5 Days #5 tab 09/20/22  Rx


 


Ibuprofen [Motrin] 600 mg PO Q6HR PRN #60 tab 09/20/22  Rx


 


cefUROXime axetiL [Ceftin] 500 mg PO BID 5 Days #10 tab 09/20/22  Rx








                                    Allergies











Allergy/AdvReac Type Severity Reaction Status Date / Time


 


ampicillin Allergy  Dyspnea, Verified 09/19/22 15:28





   RASH  


 


Penicillins Allergy  Dyspnea, Verified 09/19/22 15:28





   RASH  














Physical Exam


Vitals: 


                                   Vital Signs











  Temp Pulse Pulse Resp BP BP Pulse Ox


 


 09/20/22 07:40    59 L  16   


 


 09/20/22 07:00  98.4 F   72  14   105/66  94 L


 


 09/20/22 03:02  98.8 F   59 L  15   94/57  96


 


 09/19/22 21:16  98.3 F   65  15   99/61  95


 


 09/19/22 19:58   77   16  108/69   99


 


 09/19/22 19:43        95


 


 09/19/22 18:11   61   19  105/71   98


 


 09/19/22 17:07   66   18  105/71   96


 


 09/19/22 15:49   68   18  112/74   95


 


 09/19/22 14:39   82   18  119/83   94 L


 


 09/19/22 13:20  99.3 F  86   16  133/91   96














  FiO2


 


 09/20/22 07:40 


 


 09/20/22 07:00 


 


 09/20/22 03:02 


 


 09/19/22 21:16 


 


 09/19/22 19:58 


 


 09/19/22 19:43  21


 


 09/19/22 18:11 


 


 09/19/22 17:07 


 


 09/19/22 15:49 


 


 09/19/22 14:39 


 


 09/19/22 13:20 








                                Intake and Output











 09/19/22 09/20/22 09/20/22





 22:59 06:59 14:59


 


Other:   


 


  # Voids 2 1 


 


  Weight 79.379 kg  














Results


CBC & Chem 7: 


                                 09/19/22 13:46





                                 09/19/22 13:46


Labs: 


                  Abnormal Lab Results - Last 24 Hours (Table)











  09/19/22 Range/Units





  13:46 


 


Sodium  136 L  (137-145)  mmol/L


 


Glucose  123 H  (74-99)  mg/dL














Thrombosis Risk Factor Assmnt





- Choose All That Apply


Any of the Below Risk Factors Present?: Yes


Each Factor Represents 1 point: Age 41-60 years, Obesity (BMI >25)


Other Risk Factors: No


Other congenital or acquired thrombophilia - If yes, enter type in comment: No


Thrombosis Risk Factor Assessment Total Risk Factor Score: 2


Thrombosis Risk Factor Assessment Level: Low Risk

## 2022-09-20 NOTE — P.CRDCN
History of Present Illness


History of present illness: 


This is a 57 year old male with a past medical history of family history of 

coronary artery disease (father had 3v CABG in his 60s). He does not follow with

a cardiologist. We are consulted for chest pain. Patient presented to the ER 

with 2 day history of left sided chest discomfort. It was non-radiating, non-

exertional. He states yesterday he was working on his car and did noticed a "sor

e" pain left upper rib area. It was aggravated by taking a deep breath and 

resting. The pain improved with movement of his chest and walking. He also 

endorsed associated chills. Denies any fever, cough, diaphoresis, nausea, 

vomiting, shortness of breath. He denies any symptoms of orthopnea or PND. He 

denies any history of CAD, MI, Stroke, Diabetes, hypertension, dyslipidemia. He 

denies tobacco use. 





DIAGNOSTICS


* EKG reveals sinus rhythm, heart rate 80, T wave inversions in lead III and 

  aVF. No significant ST-T wave abnormalities to suggest ischemia 


* Telemetry tracings indicate sinus rhythm 


* Chest xray patchy densities present in the left lung base.


* Laboratory reviewed, troponin negative 3, d-dimer negative, CBC unremarkable,

  Covid-19 Negative, sodium 139, potassium 4.2, BUN 17, serum creatinine 1.01


* Current home medications include none





REVIEW OF SYSTEMS


At the time of my exam:


CONSTITUTIONAL: Denies fever. Reports chills.


CARDIOVASCULAR: + chest pain worse with deep breathing on exam, shortness of 

breath, orthopnea, PND or palpitations.


RESPIRATORY: Denies cough. 


GASTROINTESTINAL: Denies abdominal pain, diarrhea, constipation, nausea or 

vomiting.


MUSCULOSKELETAL: Denies myalgias.


NEUROLOGIC: Denies numbness, tingling, headache or weakness.


ENDOCRINE: Denies fatigue, weight change,  polydipsia or polyurina.


GENITOURINARY: Denies burning, hematuria or urgency with micturation.


HEMATOLOGIC: Denies history of anemia or bleeding. 





PHYSICAL EXAMINATION


Blood pressure 105/66, heart rate 72, afebrile, saturations 94% on room air


CONSTITUTIONAL: No apparent distress. 


HEENT: Head is normocephalic. Pupils are equal, round. Sclerae anicteric. Mucous

membranes of the mouth are moist.  No JVD. No carotid bruit.


CHEST EXAMINATION: Lungs are clear to auscultation. There is chest wall 

tenderness is noted with deep breathing. 


HEART EXAMINATION: Regular rate and rhythm. S1, S2 heard. No murmurs, gallops or

rub.


ABDOMEN: Soft, nontender. Positive bowel sounds.


EXTREMITIES: 2+ peripheral pulses, no lower extremity edema and no calf 

tenderness.


SKIN: warm dry. 


NEUROLOGIC EXAMINATION: Patient is awake, alert and oriented x3. 





ASSESSMENT


Chest discomfort, atypical, appears pleuritic on exam with aggravation by deep 

inspiration


Symptoms of Chills


Possible left lower lobe pneumonia


Family history of coronary artery disease





PLAN


An acute coronary event has been ruled out with no EKG evidence of ischemia and 

negative cardiac enzymes. 


Echocardiogram obtained that revealed EF 55-60%, mild concentric LVH, mild MR, 

mild TR


No further inpatient workup from a cardiology perspective


Patient may follow up outpatient. 





Thank you kindly for this consultation. 


Nurse practitioner note has been reviewed by physician. Signing provider agrees 

with the documented findings, assessment, and plan of care. 








Past Medical History


Past Medical History: Hyperlipidemia, Osteoarthritis (OA)


Additional Past Medical History / Comment(s): MINOR OA. in bilat feet which pt 

stated has caused some balance issues.  BILAT INGUINAL HERNIA


History of Any Multi-Drug Resistant Organisms: None Reported


Past Surgical History: Hernia Repair


Additional Past Surgical History / Comment(s): COLONOSCOPY


Past Anesthesia/Blood Transfusion Reactions: Motion Sickness


Past Psychological History: No Psychological Hx Reported


Smoking Status: Never smoker


Past Alcohol Use History: Occasional


Additional Past Alcohol Use History / Comment(s): 6 PACK PER WK


Past Drug Use History: None Reported





- Past Family History


  ** Mother


Family Medical History: Cancer





Medications and Allergies


                                Home Medications











 Medication  Instructions  Recorded  Confirmed  Type


 


No Known Home Medications  09/19/22 09/19/22 History








                                    Allergies











Allergy/AdvReac Type Severity Reaction Status Date / Time


 


ampicillin Allergy  Dyspnea, Verified 09/19/22 15:28





   RASH  


 


Penicillins Allergy  Dyspnea, Verified 09/19/22 15:28





   RASH  














Physical Exam


Vitals: 


                                   Vital Signs











  Temp Pulse Pulse Resp BP BP Pulse Ox


 


 09/20/22 03:02  98.8 F   59 L  15   94/57  96


 


 09/19/22 21:16  98.3 F   65  15   99/61  95


 


 09/19/22 19:58   77   16  108/69   99


 


 09/19/22 19:43        95


 


 09/19/22 18:11   61   19  105/71   98


 


 09/19/22 17:07   66   18  105/71   96


 


 09/19/22 15:49   68   18  112/74   95


 


 09/19/22 14:39   82   18  119/83   94 L


 


 09/19/22 13:20  99.3 F  86   16  133/91   96














  FiO2


 


 09/20/22 03:02 


 


 09/19/22 21:16 


 


 09/19/22 19:58 


 


 09/19/22 19:43  21


 


 09/19/22 18:11 


 


 09/19/22 17:07 


 


 09/19/22 15:49 


 


 09/19/22 14:39 


 


 09/19/22 13:20 








                                Intake and Output











 09/19/22 09/20/22 09/20/22





 22:59 06:59 14:59


 


Other:   


 


  # Voids 2 1 


 


  Weight 79.379 kg  














Results





                                 09/19/22 13:46





                                 09/19/22 13:46


                                 Cardiac Enzymes











  09/19/22 09/19/22 09/19/22 Range/Units





  13:46 13:46 16:57 


 


AST  20    (17-59)  U/L


 


Troponin I   <0.012  <0.012  (0.000-0.034)  ng/mL














  09/19/22 Range/Units





  19:50 


 


AST   (17-59)  U/L


 


Troponin I  <0.012  (0.000-0.034)  ng/mL








                                   Coagulation











  09/19/22 Range/Units





  13:46 


 


PT  10.2  (9.0-12.0)  sec


 


APTT  24.6  (22.0-30.0)  sec








                                       CBC











  09/19/22 Range/Units





  13:46 


 


WBC  8.7  (3.8-10.6)  k/uL


 


RBC  4.97  (4.30-5.90)  m/uL


 


Hgb  14.9  (13.0-17.5)  gm/dL


 


Hct  44.3  (39.0-53.0)  %


 


Plt Count  234  (150-450)  k/uL








                          Comprehensive Metabolic Panel











  09/19/22 Range/Units





  13:46 


 


Sodium  136 L  (137-145)  mmol/L


 


Potassium  4.2  (3.5-5.1)  mmol/L


 


Chloride  101  ()  mmol/L


 


Carbon Dioxide  24  (22-30)  mmol/L


 


BUN  17  (9-20)  mg/dL


 


Creatinine  1.01  (0.66-1.25)  mg/dL


 


Glucose  123 H  (74-99)  mg/dL


 


Calcium  9.6  (8.4-10.2)  mg/dL


 


AST  20  (17-59)  U/L


 


ALT  20  (4-49)  U/L


 


Alkaline Phosphatase  67  ()  U/L


 


Total Protein  6.9  (6.3-8.2)  g/dL


 


Albumin  4.7  (3.5-5.0)  g/dL








                               Current Medications











Generic Name Dose Route Start Last Admin





  Trade Name [place]  PRN Reason Stop Dose Admin


 


Aspirin  325 mg  09/20/22 09:00 





  Aspirin 325 Mg Tab  PO  





  DAILY [facility]  


 


Enoxaparin Sodium  40 mg  09/20/22 09:00 





  Enoxaparin 40 Mg/0.4 Ml Syringe  SQ  





  DAILY [facility]  


 


Azithromycin 500 mg/ Sodium  250 mls @ 250 mls/hr  09/20/22 17:00 





  Chloride  IVPB  09/23/22 17:01 





  DAILY@1700 [facility]  





  Protocol  


 


Ceftriaxone Sodium 1 gm/  50 mls @ 100 mls/hr  09/20/22 09:00 





  Sodium Chloride  IVPB  





  DAILY [facility]  





  Protocol  


 


Ketorolac Tromethamine  15 mg  09/19/22 18:00  09/20/22 05:28





  Ketorolac 15 Mg/Ml 1 Ml Vial  IVP  09/22/22 16:22  15 mg





  Q6HR [facility]   Administration


 


Nitroglycerin  0.4 mg  09/19/22 16:17 





  Nitroglycerin Sl Tabs 0.4 Mg Tab  SUBLINGUAL  





  Q5M PRN  





  Chest Pain  


 


Nitroglycerin  1 inch  09/19/22 18:00  09/20/22 05:30





  Nitroglycerin Oint 1 Inch/Gm Packet  TOPICAL   Not Given





  Q6HR [facility]  








                                Intake and Output











 09/19/22 09/20/22 09/20/22





 22:59 06:59 14:59


 


Other:   


 


  # Voids 2 1 


 


  Weight 79.379 kg  








                                        





                                 09/19/22 13:46 





                                 09/19/22 13:46

## 2022-09-20 NOTE — CA
Transthoracic Echo Report 

 Name: Baltazar Palomares 

 MRN:    [id number] 

 Age:    57     Gender:     M 

 

 :    1965 

 Exam Date:     2022 08:13 

 Exam Location: [facility] 

 Ht (in):     68     Wt (lb):     175 

 Ordering Physician:        Randolph Lambert DO 

 Attending/Referring Phys:         , Petra 

 Technician         Amy Nicole, [facility] 

 Procedure CPT: 

 Indications:       CP 

 

 Cardiac Hx: 

 Technical Quality:      Fair 

 Contrast 1:                                Total Dose (mL): 

 Contrast 2:                                Total Dose (mL): 

 

 MEASUREMENTS  (Male / Female) Normal Values 

 2D ECHO 

 LV Diastolic Diameter PLAX        4.2 cm                4.2 - 5.9 / 3.9 - 5.3 cm 

 LV Systolic Diameter PLAX         2.8 cm                 

 IVS Diastolic Thickness           1.1 cm                0.6 - 1.0 / 0.6 - 0.9 cm 

 LVPW Diastolic Thickness          1.3 cm                0.6 - 1.0 / 0.6 - 0.9 cm 

 LV Relative Wall Thickness        0.6                    

 RV Internal Dim ED PLAX           4.0 cm                 

 LA Volume                         50.2 cm???              18 - 58 / 22 - 52 cm??? 

 

 M-MODE 

 Aortic Root Diameter MM           3.2 cm                 

 LA Systolic Diameter MM           3.9 cm                 

 LA Ao Ratio MM                    1.2                    

 AV Cusp Separation MM             2.2 cm                 

 

 DOPPLER 

 AV Peak Velocity                  110.6 cm/s             

 AV Peak Gradient                  4.9 mmHg               

 LVOT Peak Velocity                97.9 cm/s              

 LVOT Peak Gradient                3.8 mmHg               

 MV Area PHT                       2.7 cm???                

 Mitral E Point Velocity           62.7 cm/s              

 Mitral A Point Velocity           74.8 cm/s              

 Mitral E to A Ratio               0.8                    

 MV Deceleration Time              280.0 ms               

 MV E' Velocity                    8.1 cm/s               

 Mitral E to MV E' Ratio           7.7                    

 TR Peak Velocity                  236.2 cm/s             

 TR Peak Gradient                  22.3 mmHg              

 Right Ventricular Systolic Press  25.7 mmHg              

 

 

 FINDINGS 

 Left Ventricle 

 Mildly increased left ventricular wall thickness. Normal left ventricular  

 systolic function with no obvious regional wall motion abnormalities. Left  

 ventricular ejection fraction is estimated at 55-60 %. 

 

 Right Ventricle 

 Moderate right ventricular dilatation. 

 

 Right Atrium 

 Normal right atrial size. 

 

 Left Atrium 

 Normal left atrial size. 

 

 Mitral Valve 

 Structurally normal mitral valve. No mitral stenosis. Mild mitral  

 regurgitation. 

 

 Aortic Valve 

 Trileaflet aortic valve. No aortic valve stenosis or regurgitation. 

 

 Tricuspid Valve 

 Structurally normal tricuspid valve. Mild tricuspid regurgitation. 

 

 Pulmonic Valve 

 Structurally normal pulmonic valve. Trace pulmonic regurgitation. 

 

 Pericardium 

 No pericardial effusion. 

 

 Aorta 

 Normal size aortic root and proximal ascending aorta. 

 

 CONCLUSIONS 

 Mild concentric left ventricular hypertrophy with normal LV systolic function.   

 Mild mitral regurgitation.  Mild tricuspid regurgitation 

 Previewed by:  

 Dr. Neil Avila MD 

 (Electronically Signed) 

 Final Date:      2022 10:09

## 2022-09-21 NOTE — P.DS
Providers


Date of admission: 


09/19/22 16:20





Expected date of discharge: 09/20/22


Attending physician: 


Doc Velázquez MD





Consults: 





                                        





09/19/22 16:18


Consult Physician Urgent 


   Consulting Provider: Neil Avila


   Consult Reason/Comments: cp


   Do you want consulting provider notified?: Yes











Primary care physician: 


Gayathri Brumfield





Hospital Course: 











Final diagnosis





Chest discomfort, atypical, ACS ruled out


Pleuritic chest pain on deep inspiration


Possible left lower lobe pneumonia, although suspicion is low and treated 

empirically, most likely atelectasis


Hyperlipidemia


Recent mechanical fall with no LOC onto the left buttock with a large contusion


GI prophylaxis


DVT prophylaxis


Full code








Discharge disposition


Patient is being discharged in a stable condition with guarded prognosis to 

home.  Patient will follow-up with Dr. Brumfield   in the outpatient setting upon 

discharge.  Patient is to continue with oral Ceftin and Zithromax for the next 

few days to complete the course.  Total time taken is greater than 35 minutes.





Hospital course


This is a 57-year-old male who was recently admitted with left-sided chest pain 

on deep inspiration and was being closely monitored.  Patient was evaluated by 

cardiology recommending mostly musculoskeletal in questioning left lower lobe 

pneumonia versus atelectasis.  Patient was cleared by cardiology and 2-D echo 

was done showing some mild mitral regurgitation with an EF of 55-60%.  

Borderline hyperlipidemia and patient is aware and has been monitoring and 

trying diet control.  Patient was given an incentive spirometer and encouraged 

to use at least 10 times every hour while awake.  After review of chest x-ray 

appears to be atelectasis and recommend outpatient follow-up with Dr. Brumfield 

after antibiotic course with possible repeat chest x-ray in the next 2-3 weeks. 

Patient will continue oral Ceftin 500 mg twice daily along with Zithromax 500 mg

daily for the next 5 days to complete the course.  Also recently had a fall with

a large left buttock contusion on the left side and was concerned for possible 

blood clot in the lungs.  D-dimer was negative.  Patient is afebrile and denies 

shortness of breath. Currently no reports of chest pain, shortness of breath, or

palpitations.  Patient is afebrile.  No reports of nausea or vomiting and 

patient is tolerating diet.  Patient will be discharged home today.





Physical exam:








Gen: This is a 57-year-old male awake, alert and oriented 3, well-developed, 

well-nourished.


HEENT: Head is atraumatic, normocephalic. Pupils equal, round. Sclerae is 

anicteric. 


NECK: Supple. No JVD. No lymphadenopathy. No thyromegaly. 


LUNGS: Clear to auscultation. No wheezes or rhonchi.  No intercostal 

retractions.


HEART: Regular rate and rhythm. No murmur. 


ABDOMEN: Soft. Bowel sounds are present. No masses.  No tenderness.


EXTREMITIES: No pedal edema.  No calf tenderness.


NEUROLOGICAL: Patient is awake, alert and oriented x3. Cranial nerves 2 through 

12 are grossly intact. 





Please refer to medication reconciliation sheet for a list of medications.





The impression and plan of care has been dictated by Oly Enriquez, Nurse 

Practitioner as directed.





Dr. Marycruz MD


I have performed a history and examination and MDM of this patient, discussed 

the same with the dictator, and  agree with the dictator's assessment and plan 

as written ,documented as a scribe. Based on total visit time,  I have performed

more than 50% of the visit.  


Patient Condition at Discharge: Fair





Plan - Discharge Summary


Discharge Rx Participant: No


New Discharge Prescriptions: 


New


   Ibuprofen [Motrin] 600 mg PO Q6HR PRN #60 tab


     PRN Reason: Pain


   cefUROXime axetiL [Ceftin] 500 mg PO BID 5 Days #10 tab


   Azithromycin [Zithromax] 500 mg PO DAILY 5 Days #5 tab


Discharge Medication List





Azithromycin [Zithromax] 500 mg PO DAILY 5 Days #5 tab 09/20/22 [Rx]


Ibuprofen [Motrin] 600 mg PO Q6HR PRN #60 tab 09/20/22 [Rx]


cefUROXime axetiL [Ceftin] 500 mg PO BID 5 Days #10 tab 09/20/22 [Rx]








Follow up Appointment(s)/Referral(s): 


Gayathri Brumfield DO [Primary Care Provider] - 1 Week


Neil Avila MD [STAFF PHYSICIAN] - 3 Weeks (office will call with follow up 

appointment)


Patient Instructions/Handouts:  Chest Pain (DC)


Activity/Diet/Wound Care/Special Instructions: 


Activity Limited until follow-up


Follow-up with primary care provider on discharge


Continue taking medications as prescribed


Follow heart healthy low-cholesterol diet


Continue with incentive spirometer use at least 10 times every hour while awake


Recommend possible repeat chest x-ray in the next few weeks with primary care 

follow-up





Discharge Disposition: HOME SELF-CARE